# Patient Record
(demographics unavailable — no encounter records)

---

## 2025-05-22 NOTE — IMAGING
[Left] : left hip with pelvis [All Views] : anteroposterior, lateral [Severe arthritis (Tonnis Grade 3)] : Severe arthritis (Tonnis Grade 3) [FreeTextEntry9] : bone on bone with subchondral sclerosis and cyst formation

## 2025-05-22 NOTE — ASSESSMENT
[FreeTextEntry1] : 62 Y M with severe LT hip OA.  XR reviewed and discussed with patient today. Patient was educated and informed on their condition along with the expected outcomes. Patient is a surgical candidate for LT JOHNATHAN. Discussed risks and benefits. All questions answered. Follow up with surgical rdz to schedule procedure.  The patient's current medication management of their orthopedic diagnosis was reviewed today:   (1) We discussed a comprehensive treatment plan that included possible pharmaceutical management involving the use of prescription strength medications including but not limited to options such as oral Naprosyn 500mg BID, once daily Meloxicam 15 mg, or 500-650 mg Tylenol versus over the counter oral medications and topical prescription NSAID Pennsaid vs over the counter Voltaren gel.   (2) There is a moderate risk of morbidity with further treatment, especially from use of prescription strength medications and possible side effects of these medications which include upset stomach with oral medications, skin reactions to topical medications and cardiac/renal issues with long term use.   (3) I recommended that the patient follow-up with their medical physician to discuss any significant specific potential issues with long term medication use such as interactions with current medications or with exacerbation of underlying medical comorbidities.   (4) The benefits and risks associated with use of injectable, oral or topical, prescription and over the counter anti-inflammatory medications were discussed with the patient. The patient voiced understanding of the risks including but not limited to bleeding, stroke, kidney dysfunction, heart disease, and were referred to the black box warning label for further information.  Prior to appointment and during encounter with patient extensive medical records were reviewed including but not limited to, hospital records, out patient records, imaging results, and lab data. During this appointment the patient was examined, diagnoses were discussed and explained in a face to face manner. In addition extensive time was spent reviewing aforementioned diagnostic studies. Counseling including abnormal image results, differential diagnoses, treatment options, risk and benefits, lifestyle changes, current condition, and current medications was performed. Patient's comments, questions, and concerns were address and patient verbalized understanding.

## 2025-05-22 NOTE — HISTORY OF PRESENT ILLNESS
[Dull/Aching] : dull/aching [Radiating] : radiating [Tightness] : tightness [Full time] : Work status: full time [de-identified] : 63 y/o M here for further evaluation of his left hip/thigh. He c/o pain for many years. He attributes his pain to playing tennis. He stopped playing tennis 18 months ago, but he continues to have pain. He has pain with bending down tying his shoe laces.  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

## 2025-07-03 NOTE — PHYSICAL EXAM
[] : light touch intact throughout [Left] : left hip with pelvis [There are no fractures, subluxations or dislocations. No significant abnormalities are seen] : There are no fractures, subluxations or dislocations. No significant abnormalities are seen [No loss of surgical correlation. Bony alignment acceptable. Hardware in appropriate position] : No loss of surgical correlation. Bony alignment acceptable. Hardware in appropriate position [FreeTextEntry3] : thigh atrophy

## 2025-07-03 NOTE — HISTORY OF PRESENT ILLNESS
[2] : 2 [0] : 0 [Full time] : Work status: full time [de-identified] : 63 y/o M here for further evaluation of his left hip/thigh. He c/o pain for many years. He attributes his pain to playing tennis. He stopped playing tennis 18 months ago, but he continues to have pain. He has pain with bending down tying his shoe laces.   7/3/25 2 weeks s/p L JOHNATHAN, minimal pain, ambulating well [de-identified] : p/t

## 2025-07-03 NOTE — ASSESSMENT
[FreeTextEntry1] : 62 Y M 2 weeks s/p L JOHNATHAN  Begin PT progress activities as tolerated return 4 weeks  We discussed the patient's progress. The patient was reminded of their hip dislocation precautions and their antibiotic prophylaxis. We discussed continued physical therapy and/or a home exercise program. Questions about their hip replacement and future follow up were answered and discussed.